# Patient Record
Sex: FEMALE | URBAN - METROPOLITAN AREA
[De-identification: names, ages, dates, MRNs, and addresses within clinical notes are randomized per-mention and may not be internally consistent; named-entity substitution may affect disease eponyms.]

---

## 2019-06-19 ENCOUNTER — APPOINTMENT (RX ONLY)
Dept: URBAN - METROPOLITAN AREA CLINIC 124 | Facility: CLINIC | Age: 44
Setting detail: DERMATOLOGY
End: 2019-06-19

## 2019-06-19 DIAGNOSIS — D22 MELANOCYTIC NEVI: ICD-10-CM

## 2019-06-19 DIAGNOSIS — D18.0 HEMANGIOMA: ICD-10-CM

## 2019-06-19 DIAGNOSIS — L91.8 OTHER HYPERTROPHIC DISORDERS OF THE SKIN: ICD-10-CM

## 2019-06-19 PROBLEM — D18.01 HEMANGIOMA OF SKIN AND SUBCUTANEOUS TISSUE: Status: ACTIVE | Noted: 2019-06-19

## 2019-06-19 PROBLEM — D22.4 MELANOCYTIC NEVI OF SCALP AND NECK: Status: ACTIVE | Noted: 2019-06-19

## 2019-06-19 PROBLEM — Z85.3 PERSONAL HISTORY OF MALIGNANT NEOPLASM OF BREAST: Status: ACTIVE | Noted: 2019-06-19

## 2019-06-19 PROCEDURE — ? COUNSELING

## 2019-06-19 PROCEDURE — 99202 OFFICE O/P NEW SF 15 MIN: CPT

## 2019-06-19 PROCEDURE — ? BENIGN DESTRUCTION COSMETIC

## 2019-06-19 ASSESSMENT — LOCATION SIMPLE DESCRIPTION DERM
LOCATION SIMPLE: RIGHT ANTERIOR NECK
LOCATION SIMPLE: LEFT ANTERIOR NECK
LOCATION SIMPLE: LEFT POSTERIOR AXILLA

## 2019-06-19 ASSESSMENT — LOCATION ZONE DERM
LOCATION ZONE: NECK
LOCATION ZONE: AXILLAE

## 2019-06-19 ASSESSMENT — LOCATION DETAILED DESCRIPTION DERM
LOCATION DETAILED: LEFT POSTERIOR AXILLA
LOCATION DETAILED: RIGHT INFERIOR LATERAL NECK
LOCATION DETAILED: RIGHT SUPERIOR LATERAL NECK
LOCATION DETAILED: RIGHT INFERIOR ANTERIOR NECK
LOCATION DETAILED: LEFT INFERIOR LATERAL NECK

## 2019-06-19 NOTE — PROCEDURE: BENIGN DESTRUCTION COSMETIC
Detail Level: Simple
Post-Care Instructions: I reviewed with the patient in detail post-care instructions. Patient is to wear sunprotection, and avoid picking at any of the treated lesions. Pt may apply Vaseline to crusted or scabbing areas.
Anesthesia Volume In Cc: 0.5
Consent: The patient's consent was obtained including but not limited to risks of crusting, scabbing, blistering, scarring, darker or lighter pigmentary change, recurrence, incomplete removal and infection.
Price (Use Numbers Only, No Special Characters Or $): 75

## 2019-06-19 NOTE — PROCEDURE: MIPS QUALITY
Quality 130: Documentation Of Current Medications In The Medical Record: Current Medications with Name, Dosage, Frequency, or Route not Documented, Reason not Given
Quality 131: Pain Assessment And Follow-Up: Pain assessment using a standardized tool is documented as negative, no follow-up plan required
Detail Level: Simple
Additional Notes: Pain 0/10 \\nPt not on any medication \\nPt not instructed in receiving vaccine
Quality 474: Zoster Vaccination Status: Shingrix Vaccination not Administered or Previously Received, Reason not Otherwise Specified

## 2022-06-04 ENCOUNTER — TELEPHONE ENCOUNTER (OUTPATIENT)
Dept: URBAN - METROPOLITAN AREA CLINIC 68 | Facility: CLINIC | Age: 47
End: 2022-06-04

## 2022-06-04 RX ORDER — POLYETHYLENE GLYCOL 3350, SODIUM SULFATE, SODIUM CHLORIDE, POTASSIUM CHLORIDE, ASCORBIC ACID, SODIUM ASCORBATE 7.5-2.691G
KIT ORAL AS DIRECTED
Qty: 1 | Refills: 0 | OUTPATIENT
Start: 2013-11-25 | End: 2013-11-26

## 2022-06-05 ENCOUNTER — TELEPHONE ENCOUNTER (OUTPATIENT)
Dept: URBAN - METROPOLITAN AREA CLINIC 68 | Facility: CLINIC | Age: 47
End: 2022-06-05

## 2022-06-05 RX ORDER — PHENOL 1.4 %
CALCIUM CARBONATE( 600MG ORAL  DAILY ) ACTIVE -HX ENTRY AEROSOL, SPRAY (ML) MUCOUS MEMBRANE DAILY
Status: ACTIVE | COMMUNITY
Start: 2013-11-25

## 2022-06-05 RX ORDER — ANASTROZOLE 1 MG/1
ANASTROZOLE( 1MG ORAL  DAILY ) ACTIVE -HX ENTRY TABLET ORAL DAILY
Status: ACTIVE | COMMUNITY
Start: 2013-11-25

## 2022-06-05 RX ORDER — FERROUS SULFATE 325(65) MG
VITAMIN D3( 2000UNIT ORAL  DAILY ) ACTIVE -HX ENTRY TABLET ORAL DAILY
Status: ACTIVE | COMMUNITY
Start: 2013-11-25

## 2022-06-25 ENCOUNTER — TELEPHONE ENCOUNTER (OUTPATIENT)
Age: 47
End: 2022-06-25

## 2022-06-25 RX ORDER — POLYETHYLENE GLYCOL 3350, SODIUM SULFATE, SODIUM CHLORIDE, POTASSIUM CHLORIDE, ASCORBIC ACID, SODIUM ASCORBATE 7.5-2.691G
KIT ORAL AS DIRECTED
Qty: 1 | Refills: 0 | OUTPATIENT
Start: 2013-11-25 | End: 2013-11-26

## 2022-06-26 ENCOUNTER — TELEPHONE ENCOUNTER (OUTPATIENT)
Age: 47
End: 2022-06-26

## 2022-06-26 RX ORDER — ANASTROZOLE 1 MG/1
ANASTROZOLE( 1MG ORAL  DAILY ) ACTIVE -HX ENTRY TABLET ORAL DAILY
Status: ACTIVE | COMMUNITY
Start: 2013-11-25

## 2022-06-26 RX ORDER — GLUCOSAMINE/MSM/CHONDROIT SULF 500-166.6
VITAMIN D3( 2000UNIT ORAL  DAILY ) ACTIVE -HX ENTRY TABLET ORAL DAILY
Status: ACTIVE | COMMUNITY
Start: 2013-11-25